# Patient Record
Sex: FEMALE
[De-identification: names, ages, dates, MRNs, and addresses within clinical notes are randomized per-mention and may not be internally consistent; named-entity substitution may affect disease eponyms.]

---

## 2017-04-06 ENCOUNTER — FORM ENCOUNTER (OUTPATIENT)
Age: 27
End: 2017-04-06

## 2017-04-06 ENCOUNTER — OUTPATIENT (OUTPATIENT)
Dept: OUTPATIENT SERVICES | Facility: HOSPITAL | Age: 27
LOS: 1 days | End: 2017-04-06
Payer: MEDICAID

## 2017-04-07 PROCEDURE — 73720 MRI LWR EXTREMITY W/O&W/DYE: CPT

## 2017-04-07 PROCEDURE — A9577: CPT

## 2017-04-07 PROCEDURE — 73720 MRI LWR EXTREMITY W/O&W/DYE: CPT | Mod: 26,LT

## 2022-04-19 ENCOUNTER — HOSPITAL ENCOUNTER (EMERGENCY)
Facility: HOSPITAL | Age: 32
Discharge: HOME | End: 2022-04-19
Attending: EMERGENCY MEDICINE | Admitting: EMERGENCY MEDICINE

## 2022-04-19 VITALS
RESPIRATION RATE: 18 BRPM | HEIGHT: 66 IN | TEMPERATURE: 97.5 F | SYSTOLIC BLOOD PRESSURE: 132 MMHG | HEART RATE: 85 BPM | OXYGEN SATURATION: 99 % | DIASTOLIC BLOOD PRESSURE: 86 MMHG

## 2022-04-19 DIAGNOSIS — N89.8 VAGINAL DISCHARGE: Primary | ICD-10-CM

## 2022-04-19 DIAGNOSIS — B37.3 YEAST VAGINITIS: ICD-10-CM

## 2022-04-19 LAB
BILIRUB UR QL STRIP: NEGATIVE
CLARITY UR: CLEAR
COLOR UR: YELLOW
EXT PREG TEST URINE: NORMAL
EXT. CONTROL ED NAV: NORMAL
GLUCOSE UR STRIP-MCNC: NEGATIVE MG/DL
HGB UR QL STRIP.AUTO: NEGATIVE
KETONES UR STRIP-MCNC: NEGATIVE MG/DL
LEUKOCYTE ESTERASE UR QL STRIP: NEGATIVE
NITRITE UR QL STRIP: NEGATIVE
PH UR STRIP.AUTO: 6 [PH] (ref 4.5–8)
PROT UR STRIP-MCNC: NEGATIVE MG/DL
SP GR UR STRIP.AUTO: >=1.03 (ref 1–1.03)
UROBILINOGEN UR QL STRIP.AUTO: 0.2 E.U./DL

## 2022-04-19 PROCEDURE — 87591 N.GONORRHOEAE DNA AMP PROB: CPT | Performed by: PHYSICIAN ASSISTANT

## 2022-04-19 PROCEDURE — 81003 URINALYSIS AUTO W/O SCOPE: CPT

## 2022-04-19 PROCEDURE — 99284 EMERGENCY DEPT VISIT MOD MDM: CPT | Performed by: PHYSICIAN ASSISTANT

## 2022-04-19 PROCEDURE — 87510 GARDNER VAG DNA DIR PROBE: CPT | Performed by: PHYSICIAN ASSISTANT

## 2022-04-19 PROCEDURE — 87660 TRICHOMONAS VAGIN DIR PROBE: CPT | Performed by: PHYSICIAN ASSISTANT

## 2022-04-19 PROCEDURE — 87491 CHLMYD TRACH DNA AMP PROBE: CPT | Performed by: PHYSICIAN ASSISTANT

## 2022-04-19 PROCEDURE — 81025 URINE PREGNANCY TEST: CPT | Performed by: PHYSICIAN ASSISTANT

## 2022-04-19 PROCEDURE — 99284 EMERGENCY DEPT VISIT MOD MDM: CPT

## 2022-04-19 PROCEDURE — 87480 CANDIDA DNA DIR PROBE: CPT | Performed by: PHYSICIAN ASSISTANT

## 2022-04-19 RX ORDER — METRONIDAZOLE 500 MG/1
500 TABLET ORAL EVERY 12 HOURS SCHEDULED
Qty: 14 TABLET | Refills: 0 | Status: SHIPPED | OUTPATIENT
Start: 2022-04-19 | End: 2022-04-19 | Stop reason: SDUPTHER

## 2022-04-19 RX ORDER — METRONIDAZOLE 500 MG/1
500 TABLET ORAL EVERY 12 HOURS SCHEDULED
Qty: 14 TABLET | Refills: 0 | Status: SHIPPED | OUTPATIENT
Start: 2022-04-19 | End: 2022-04-26

## 2022-04-19 RX ORDER — FLUCONAZOLE 150 MG/1
150 TABLET ORAL ONCE
Status: COMPLETED | OUTPATIENT
Start: 2022-04-19 | End: 2022-04-19

## 2022-04-19 RX ADMIN — FLUCONAZOLE 150 MG: 150 TABLET ORAL at 18:44

## 2022-04-19 NOTE — ED PROVIDER NOTES
History  Chief Complaint   Patient presents with    Abdominal Pain     Pt reports LLQ pain and reports unprotected sex "a few weeks ago" reports discomfort and odor with discharge  Patient is a 35 y/o female with no significant PMH who presents for evaluation of suprapubic discomfort and vaginal discharge  She states she always feels some heaviness to her suprapubic region but has attributed it to her IUD  She states she also started with some foul smelling vaginal discharge for 2-3 days  She states she does gets frequent BV episodes but states it seems slightly different  She states she did have unprotected intercourse a few weeks ago  She states the next day after that she was tested for STDs and was negative however she's concerned it was too early  She's concerned she may have UTI or STD  She denies fever, chills, chest pain, shortness breath, flank pain, dysuria, hematuria, abnormal vaginal bleeding, pain with intercourse, genital rash or lesions  None       History reviewed  No pertinent past medical history  History reviewed  No pertinent surgical history  History reviewed  No pertinent family history  I have reviewed and agree with the history as documented  E-Cigarette/Vaping     E-Cigarette/Vaping Substances     Social History     Tobacco Use    Smoking status: Not on file    Smokeless tobacco: Not on file   Substance Use Topics    Alcohol use: Not on file    Drug use: Not on file       Review of Systems   Constitutional: Negative for chills and fever  Respiratory: Negative for shortness of breath  Cardiovascular: Negative for chest pain  Gastrointestinal: Positive for abdominal pain and nausea  Negative for diarrhea and vomiting  Genitourinary: Positive for dysuria, frequency, pelvic pain and vaginal discharge  Negative for dyspareunia, genital sores and hematuria  Skin: Negative for rash  All other systems reviewed and are negative        Physical Exam  Physical Exam  Vitals and nursing note reviewed  Exam conducted with a chaperone present  Constitutional:       General: She is not in acute distress  Appearance: Normal appearance  She is well-developed and normal weight  She is not ill-appearing, toxic-appearing or diaphoretic  HENT:      Head: Normocephalic and atraumatic  Right Ear: External ear normal       Left Ear: External ear normal       Nose: Nose normal    Eyes:      Conjunctiva/sclera: Conjunctivae normal    Cardiovascular:      Rate and Rhythm: Normal rate and regular rhythm  Heart sounds: Normal heart sounds  No murmur heard  Pulmonary:      Effort: Pulmonary effort is normal  No respiratory distress  Breath sounds: Normal breath sounds  No stridor  No wheezing or rhonchi  Abdominal:      General: Abdomen is flat  Bowel sounds are normal  There is no distension  Palpations: Abdomen is soft  Tenderness: There is abdominal tenderness  There is no guarding  Comments: Mild suprapubic discomfort to palpation  Genitourinary:     Comments: Female ED tech chaperone present  External genitalia normal without rashes or lesions  There is thick white discharge noted to the vaginal vault  Cervix visualized without friability or other bleeding/discharge  Bimanual exam without CMT or palpable adnexal masses  Musculoskeletal:      Cervical back: Normal range of motion  Skin:     General: Skin is warm  Neurological:      General: No focal deficit present  Mental Status: She is alert     Psychiatric:         Mood and Affect: Mood normal          Vital Signs  ED Triage Vitals   Temperature Pulse Respirations Blood Pressure SpO2   04/19/22 1652 04/19/22 1651 04/19/22 1651 04/19/22 1651 04/19/22 1651   97 5 °F (36 4 °C) 85 18 132/86 99 %      Temp Source Heart Rate Source Patient Position - Orthostatic VS BP Location FiO2 (%)   04/19/22 1652 04/19/22 1651 04/19/22 1651 04/19/22 1651 --   Oral Monitor Sitting Right arm Pain Score       --                  Vitals:    04/19/22 1651   BP: 132/86   Pulse: 85   Patient Position - Orthostatic VS: Sitting         Visual Acuity      ED Medications  Medications   fluconazole (DIFLUCAN) tablet 150 mg (150 mg Oral Given 4/19/22 1844)       Diagnostic Studies  Results Reviewed     Procedure Component Value Units Date/Time    VAGINOSIS DNA PROBE (AFFIRM) [[id number]] Collected: 04/19/22 1836    Lab Status: In process Specimen: Genital from Vaginal Updated: 04/19/22 1849    Chlamydia/GC amplified DNA by PCR [[id number]] Collected: 04/19/22 1752    Lab Status: In process Specimen: Urine, Other Updated: 04/19/22 1758    POCT pregnancy, urine [[id number]]  (Normal) Resulted: 04/19/22 1752    Lab Status: Final result Updated: 04/19/22 1752     EXT PREG TEST UR (Ref: Negative) negative (-)     Control valid    Urine Macroscopic, POC [[id number]] Collected: 04/19/22 1748    Lab Status: Final result Specimen: Urine Updated: 04/19/22 1750     Color, UA Yellow     Clarity, UA Clear     pH, UA 6 0     Leukocytes, UA Negative     Nitrite, UA Negative     Protein, UA Negative mg/dl      Glucose, UA Negative mg/dl      Ketones, UA Negative mg/dl      Urobilinogen, UA 0 2 E U /dl      Bilirubin, UA Negative     Blood, UA Negative     Specific Gravity, UA >=1 030    Narrative:      CLINITEK RESULT                 No orders to display              Procedures  Procedures         ED Course                               SBIRT 22yo+      Most Recent Value   SBIRT (22 yo +)    In order to provide better care to our patients, we are screening all of our patients for alcohol and drug use  Would it be okay to ask you these screening questions? No Filed at: 04/19/2022 1652                    MDM  Number of Diagnoses or Management Options  Vaginal discharge  Yeast vaginitis  Diagnosis management comments: Urine pregnancy negative  UA unremarkable  No sign of UTI  Will treat for yeast vaginitis with fluconazole  Patient also requesting Flagyl for possible BV as she gets this frequently  Will send script to the pharmacy  Discussed empiric treatment for STDs including gonorrhea and Chlamydia however patient would like to hold off at this time in till she gets official results  Explained to patient that she should refrain from sexual intercourse until she gets results  If positive she will need to be treated  Instructed to follow-up with her OBGYN  Given contact information for the [facility] to schedule appointment if she does not have an Dr   Patient states understanding and agrees with plan  Amount and/or Complexity of Data Reviewed  Clinical lab tests: ordered and reviewed    Patient Progress  Patient progress: stable      Disposition  Final diagnoses:   Vaginal discharge   Yeast vaginitis     Time reflects when diagnosis was documented in both MDM as applicable and the Disposition within this note     Time User Action Codes Description Comment    4/19/2022  6:39 PM Deyanira Darlington Add [N89 8] Vaginal discharge     4/19/2022  6:40 PM Deyanira Darlington Add [B37 3] Yeast vaginitis       ED Disposition     ED Disposition Condition Date/Time Comment    Discharge Stable Tue Apr 19, 2022  6:39 PM Ronan Westbrook discharge to home/self care              Follow-up Information     Follow up With Specialties Details Why Contact Info Additional [address] [facility] Obstetrics and Gynecology Schedule an appointment as soon as possible for a visit  As needed if you do not have an [id number] [address] [phone] [address], [address] [place] [address], [address] [place] [address], [address] [address] [place] [address] Emergency Department Emergency Medicine  If symptoms worsen [facility] [phone]  [address] [place] [address] Emergency Department, [facility] SPECIALTY [facility] [place], [place], [address]          Discharge Medication List as of 4/19/2022  6:41 PM      START taking these medications    Details   metroNIDAZOLE (FLAGYL) 500 mg tablet Take 1 tablet (500 mg total) by mouth every 12 (twelve) hours for 7 days, Starting Tue 4/19/2022, Until Tue 4/26/2022, Normal             No discharge procedures on file      PDMP Review     None          ED Provider  Electronically Signed by           Mone Christopher PA-C  04/19/22 1956

## 2022-04-20 LAB
C TRACH DNA SPEC QL NAA+PROBE: NEGATIVE
N GONORRHOEA DNA SPEC QL NAA+PROBE: NEGATIVE

## 2022-04-21 LAB
CANDIDA RRNA VAG QL PROBE: NEGATIVE
G VAGINALIS RRNA GENITAL QL PROBE: POSITIVE
T VAGINALIS RRNA GENITAL QL PROBE: NEGATIVE

## 2022-07-06 ENCOUNTER — HOSPITAL ENCOUNTER (EMERGENCY)
Facility: HOSPITAL | Age: 32
Discharge: HOME | End: 2022-07-06
Attending: EMERGENCY MEDICINE

## 2022-07-06 VITALS
TEMPERATURE: 97.5 F | OXYGEN SATURATION: 99 % | HEART RATE: 65 BPM | RESPIRATION RATE: 18 BRPM | DIASTOLIC BLOOD PRESSURE: 81 MMHG | BODY MASS INDEX: 32.35 KG/M2 | WEIGHT: 200.4 LBS | SYSTOLIC BLOOD PRESSURE: 123 MMHG

## 2022-07-06 DIAGNOSIS — N89.8 VAGINAL DISCHARGE: Primary | ICD-10-CM

## 2022-07-06 DIAGNOSIS — A74.9 CHLAMYDIA: ICD-10-CM

## 2022-07-06 DIAGNOSIS — H60.90 OTITIS EXTERNA: ICD-10-CM

## 2022-07-06 LAB
BILIRUB UR QL STRIP: NEGATIVE
C TRACH DNA SPEC QL NAA+PROBE: POSITIVE
CLARITY UR: CLEAR
COLOR UR: YELLOW
EXT PREG TEST URINE: NEGATIVE
EXT. CONTROL ED NAV: NORMAL
FLUAV RNA RESP QL NAA+PROBE: NEGATIVE
FLUBV RNA RESP QL NAA+PROBE: NEGATIVE
GLUCOSE UR STRIP-MCNC: NEGATIVE MG/DL
HGB UR QL STRIP.AUTO: NEGATIVE
KETONES UR STRIP-MCNC: NEGATIVE MG/DL
LEUKOCYTE ESTERASE UR QL STRIP: NEGATIVE
N GONORRHOEA DNA SPEC QL NAA+PROBE: NEGATIVE
NITRITE UR QL STRIP: NEGATIVE
PH UR STRIP.AUTO: 6.5 [PH]
PROT UR STRIP-MCNC: NEGATIVE MG/DL
SARS-COV-2 RNA RESP QL NAA+PROBE: NEGATIVE
SP GR UR STRIP.AUTO: 1.02 (ref 1–1.03)
UROBILINOGEN UR QL STRIP.AUTO: 0.2 E.U./DL

## 2022-07-06 PROCEDURE — 81025 URINE PREGNANCY TEST: CPT

## 2022-07-06 PROCEDURE — 87591 N.GONORRHOEAE DNA AMP PROB: CPT

## 2022-07-06 PROCEDURE — 87491 CHLMYD TRACH DNA AMP PROBE: CPT

## 2022-07-06 PROCEDURE — 81003 URINALYSIS AUTO W/O SCOPE: CPT

## 2022-07-06 PROCEDURE — 87510 GARDNER VAG DNA DIR PROBE: CPT

## 2022-07-06 PROCEDURE — 87636 SARSCOV2 & INF A&B AMP PRB: CPT

## 2022-07-06 PROCEDURE — 87660 TRICHOMONAS VAGIN DIR PROBE: CPT

## 2022-07-06 PROCEDURE — 99283 EMERGENCY DEPT VISIT LOW MDM: CPT

## 2022-07-06 PROCEDURE — 87480 CANDIDA DNA DIR PROBE: CPT

## 2022-07-06 PROCEDURE — 99284 EMERGENCY DEPT VISIT MOD MDM: CPT

## 2022-07-06 RX ORDER — METRONIDAZOLE 500 MG/1
500 TABLET ORAL EVERY 8 HOURS SCHEDULED
Qty: 21 TABLET | Refills: 0 | Status: SHIPPED | OUTPATIENT
Start: 2022-07-06 | End: 2022-07-13

## 2022-07-06 RX ORDER — PREDNISONE 20 MG/1
20 TABLET ORAL DAILY
Qty: 5 TABLET | Refills: 0 | Status: SHIPPED | OUTPATIENT
Start: 2022-07-06 | End: 2022-07-11

## 2022-07-06 RX ORDER — CIPROFLOXACIN AND DEXAMETHASONE 3; 1 MG/ML; MG/ML
4 SUSPENSION/ DROPS AURICULAR (OTIC) 2 TIMES DAILY
Status: DISCONTINUED | OUTPATIENT
Start: 2022-07-06 | End: 2022-07-06

## 2022-07-06 RX ORDER — CIPROFLOXACIN AND DEXAMETHASONE 3; 1 MG/ML; MG/ML
4 SUSPENSION/ DROPS AURICULAR (OTIC) 2 TIMES DAILY
Status: DISCONTINUED | OUTPATIENT
Start: 2022-07-06 | End: 2022-07-06 | Stop reason: HOSPADM

## 2022-07-06 RX ADMIN — CIPROFLOXACIN AND DEXAMETHASONE 4 DROP: 3; 1 SUSPENSION/ DROPS AURICULAR (OTIC) at 03:55

## 2022-07-06 NOTE — DISCHARGE INSTRUCTIONS
We will call if positive:  -Gonorrhea/Chlamydia  -COVID/Flu  -Yeast infection  -Bacterial Vaginosis  -Trichomoniasis    Follow up with your OB GYN    Use Ciprodex drops in both ears : 4 drops, twice daily, for 7 days    Prednisone for ear if COVID is negative and drops don't work    Flagyl if BV positive- do not consume any alcohol within 48 hours of taking this

## 2022-07-07 RX ORDER — DOXYCYCLINE HYCLATE 100 MG/1
100 CAPSULE ORAL 2 TIMES DAILY
Qty: 20 CAPSULE | Refills: 0 | Status: SHIPPED | OUTPATIENT
Start: 2022-07-07 | End: 2022-07-14

## 2022-07-14 NOTE — ED PROVIDER NOTES
History  Chief Complaint   Patient presents with    Earache     Patient reports she started two weeks ago with a left earache and now reports she has a right ear ache as well   Vaginal Discharge     Patient reports yellow vaginal discharge for the past week, reports recently she had unprotected sex but also reports she gets bacterial vaginosis frequently so she is unsure if it is that  The patient is a 25-year-old female with no significant past medical history who presents to the ED with 2 complaints  She reports that, for the past 2 weeks, she has had an earache  She reports that was initially in the left ear, has now also on the right ear  She does report associated clear drainage from bilateral ears  She describes this pain as a pressure  Otherwise denies fever, chills, cough, chest pain, dyspnea  Does report associated congestion  She also reports for the past week, she has had yellow vaginal discharge  She reports that this discharge is similar in odor and appearance was when she has had bacterial vaginosis in the past   She otherwise denies abdominal pain, nausea, vomiting, vaginal bleeding, dysuria, hematuria  None       Past Medical History:   Diagnosis Date    Asthma        History reviewed  No pertinent surgical history  History reviewed  No pertinent family history  I have reviewed and agree with the history as documented  E-Cigarette/Vaping     E-Cigarette/Vaping Substances     Social History     Tobacco Use    Smoking status: Current Every Day Smoker     Packs/day: 1 00     Types: Cigarettes    Smokeless tobacco: Never Used   Substance Use Topics    Alcohol use: Yes    Drug use: Yes     Types: Marijuana       Review of Systems   Constitutional: Negative for chills and fever  HENT: Positive for congestion, ear discharge and ear pain  Negative for rhinorrhea  Respiratory: Negative for cough and shortness of breath      Cardiovascular: Negative for chest pain and leg swelling  Gastrointestinal: Negative for abdominal pain, constipation, diarrhea, nausea and vomiting  Genitourinary: Positive for vaginal discharge  Negative for dysuria, flank pain, hematuria and vaginal bleeding  Musculoskeletal: Negative for arthralgias and myalgias  Skin: Negative for rash and wound  Neurological: Negative for dizziness, weakness, numbness and headaches  Psychiatric/Behavioral: Negative for behavioral problems  Physical Exam  Physical Exam  Vitals and nursing note reviewed  Constitutional:       General: She is not in acute distress  Appearance: She is well-developed  HENT:      Head: Normocephalic and atraumatic  Right Ear: Tympanic membrane normal  No decreased hearing noted  Swelling and tenderness (when pinna is pulled) present  No drainage  No mastoid tenderness  Left Ear: Tympanic membrane normal  No decreased hearing noted  Swelling and tenderness ( when pinna is pulled) present  No drainage  No mastoid tenderness  Nose: Congestion present  Mouth/Throat:      Mouth: Mucous membranes are moist    Eyes:      Conjunctiva/sclera: Conjunctivae normal    Cardiovascular:      Rate and Rhythm: Normal rate and regular rhythm  Heart sounds: No murmur heard  Pulmonary:      Effort: Pulmonary effort is normal  No respiratory distress  Breath sounds: Normal breath sounds  Abdominal:      Palpations: Abdomen is soft  Tenderness: There is no abdominal tenderness  There is no right CVA tenderness, left CVA tenderness, guarding or rebound  Musculoskeletal:         General: Normal range of motion  Cervical back: Neck supple  Skin:     General: Skin is warm and dry  Neurological:      Mental Status: She is alert           Vital Signs  ED Triage Vitals   Temperature Pulse Respirations Blood Pressure SpO2   07/06/22 0050 07/06/22 0048 07/06/22 0048 07/06/22 0048 07/06/22 0048   97 5 °F (36 4 °C) 73 20 122/84 100 %      Temp Source Heart Rate Source Patient Position - Orthostatic VS BP Location FiO2 (%)   07/06/22 0050 07/06/22 0048 07/06/22 0048 07/06/22 0048 --   Oral Monitor Sitting Right arm       Pain Score       07/06/22 0048       7           Vitals:    07/06/22 0048 07/06/22 0315   BP: 122/84 123/81   Pulse: 73 65   Patient Position - Orthostatic VS: Sitting Sitting         Visual Acuity      ED Medications  Medications - No data to display    Diagnostic Studies  Results Reviewed     Procedure Component Value Units Date/Time    VAGINOSIS DNA PROBE (AFFIRM) [[id number]]  (Abnormal) Collected: 07/06/22 0320    Lab Status: Final result Specimen: Genital from Vaginal Updated: 07/07/22 1605     Candida Species Negative     Gardnerella vaginalis Positive     Trichomonas vaginalis Negative    Narrative:      Performed at:  [address] [place] [address]  [id number]  : Dillon Sierra MD, Phone:  [phone]    [facility] amplified DNA by PCR [[id number]]  (Abnormal) Collected: 07/06/22 0308    Lab Status: Final result Specimen: Urine, Other Updated: 07/06/22 2118     N gonorrhoeae, DNA Probe Negative     Chlamydia trachomatis, DNA Probe Positive    Narrative:      Test performed using PCR amplification of target DNA  This test is intended as an aid in the diagnosis of Chlamydial and gonococcal disease  This test has not been evaluated in patients younger than 15years of age and is not recommended for evaluation of suspected sexual abuse  Additional testing is recommended when the results do not correlate with clinical signs and symptoms        FLU/COVID - if FLU clinically relevant [[id number]]  (Normal) Collected: 07/06/22 0308    Lab Status: Final result Specimen: Nares from Nose Updated: 07/06/22 0446     SARS-CoV-2 Negative     INFLUENZA A PCR Negative     INFLUENZA B PCR Negative    Narrative:      FOR PEDIATRIC PATIENTS - copy/paste COVID Guidelines URL to browser: [email or web address]    SARS-CoV-2 assay is a Nucleic Acid Amplification assay intended for the  qualitative detection of nucleic acid from SARS-CoV-2 in nasopharyngeal  swabs  Results are for the presumptive identification of SARS-CoV-2 RNA  Positive results are indicative of infection with SARS-CoV-2, the virus  causing COVID-19, but do not rule out bacterial infection or co-infection  with other viruses  Laboratories within the [place] and its  territories are required to report all positive results to the appropriate  public health authorities  Negative results do not preclude SARS-CoV-2  infection and should not be used as the sole basis for treatment or other  patient management decisions  Negative results must be combined with  clinical observations, patient history, and epidemiological information  This test has not been FDA cleared or approved  This test has been authorized by FDA under an Emergency Use Authorization  (EUA)  This test is only authorized for the duration of time the  declaration that circumstances exist justifying the authorization of the  emergency use of an in vitro diagnostic tests for detection of SARS-CoV-2  virus and/or diagnosis of COVID-19 infection under section 564(b)(1) of  the Act, 21 U  S C  448HEP-2(Z)(7), unless the authorization is terminated  or revoked sooner  The test has been validated but independent review by FDA  and CLIA is pending        UA w Reflex to Microscopic w Reflex to Culture [[id number]] Collected: 07/06/22 0308    Lab Status: Final result Specimen: Urine, Clean Catch Updated: 07/06/22 0334     Color, UA Yellow     Clarity, UA Clear     Specific Gravity, UA 1 025     pH, UA 6 5     Leukocytes, UA Negative     Nitrite, UA Negative     Protein, UA Negative mg/dl      Glucose, UA Negative mg/dl      Ketones, UA Negative mg/dl      Urobilinogen, UA 0 2 E U /dl      Bilirubin, UA Negative     Occult Blood, UA Negative    POCT pregnancy, urine [[id number]]  (Normal) Resulted: 07/06/22 0313    Lab Status: Final result Updated: 07/06/22 0313     EXT PREG TEST UR (Ref: Negative) Negative     Control Valid                 No orders to display              Procedures  Procedures         ED Course           SBIRT 22yo+    Flowsheet Row Most Recent Value   SBIRT (25 yo +)    In order to provide better care to our patients, we are screening all of our patients for alcohol and drug use  Would it be okay to ask you these screening questions? No Filed at: 07/06/2022 0224                    MDM  Number of Diagnoses or Management Options  Otitis externa: new and requires workup  Vaginal discharge: new and requires workup     Amount and/or Complexity of Data Reviewed  Clinical lab tests: ordered and reviewed  Tests in the medicine section of CPT®: ordered and reviewed  Decide to obtain previous medical records or to obtain history from someone other than the patient: yes  Review and summarize past medical records: yes    Risk of Complications, Morbidity, and/or Mortality  Presenting problems: moderate  Management options: low    Patient Progress  Patient progress: improved    Patient is a 34 YOF presenting for bilateral ear pain and drainage with associated congestion x 2 weeks, as well as yellow vaginal discharge x 1 week  No associated fever, chills, abdominal pain, nausea/vomiting, vaginal discharge, cough, cp, sob  Yarelis, patient with evidence of otitis externa on exam; tenderness with tugging on pinna, mild edema to canal bl  TM without erythema or perforation  Abdomen soft and nontender  No CVA tenderness  Will obtain UA, GC/C, vaginosis panel, U preg  Will treat patient's OE  Will write rx for Flagyl given patient's hx of similar symptoms in case positive  Patient advised to avoid alcohol x 48 hours while taking if positive  At the time of discharge, the patient is in no acute distress   I discussed with the patient the diagnosis, treatment plan, follow-up, return precautions, and discharge instructions; they were given the opportunity to ask questions and verbalized understanding  Disposition  Final diagnoses:   Vaginal discharge   Otitis externa   Chlamydia     Time reflects when diagnosis was documented in both MDM as applicable and the Disposition within this note     Time User Action Codes Description Comment    7/6/2022  3:47 AM Layla Spikes Add [N89 8] Vaginal discharge     7/6/2022  3:47 AM Layla Spikes Add [H60 90] Otitis externa     7/7/2022  1:42 PM Rosales Peace Add [A74 9] Chlamydia       ED Disposition     ED Disposition   Discharge    Condition   Stable    Date/Time   Wed Jul 6, 2022 Adriano Javier discharge to home/self care  Follow-up Information    None         Discharge Medication List as of 7/6/2022  3:52 AM      START taking these medications    Details   metroNIDAZOLE (FLAGYL) 500 mg tablet Take 1 tablet (500 mg total) by mouth every 8 (eight) hours for 7 days, Starting Wed 7/6/2022, Until Wed 7/13/2022, Normal      predniSONE 20 mg tablet Take 1 tablet (20 mg total) by mouth daily for 5 days, Starting Wed 7/6/2022, Until Mon 7/11/2022, Normal             No discharge procedures on file      PDMP Review     None          ED Provider  Electronically Signed by           Shimon Lo PA-C  07/14/22 6236

## 2022-12-23 ENCOUNTER — HOSPITAL ENCOUNTER (EMERGENCY)
Facility: HOSPITAL | Age: 32
Discharge: HOME | End: 2022-12-23
Attending: EMERGENCY MEDICINE

## 2022-12-23 VITALS
RESPIRATION RATE: 17 BRPM | SYSTOLIC BLOOD PRESSURE: 118 MMHG | HEART RATE: 67 BPM | OXYGEN SATURATION: 96 % | DIASTOLIC BLOOD PRESSURE: 79 MMHG | BODY MASS INDEX: 33.34 KG/M2 | TEMPERATURE: 97.5 F | WEIGHT: 206.57 LBS

## 2022-12-23 DIAGNOSIS — R21 RASH: Primary | ICD-10-CM

## 2022-12-23 RX ORDER — DIPHENHYDRAMINE HCL 25 MG
50 TABLET ORAL
Qty: 14 TABLET | Refills: 0 | Status: SHIPPED | OUTPATIENT
Start: 2022-12-23

## 2022-12-23 RX ORDER — CEPHALEXIN 500 MG/1
500 CAPSULE ORAL 3 TIMES DAILY
Qty: 21 CAPSULE | Refills: 0 | Status: SHIPPED | OUTPATIENT
Start: 2022-12-23 | End: 2022-12-30

## 2022-12-23 RX ORDER — DIPHENHYDRAMINE HCL 25 MG
50 TABLET ORAL ONCE
Status: COMPLETED | OUTPATIENT
Start: 2022-12-23 | End: 2022-12-23

## 2022-12-23 RX ORDER — CEPHALEXIN 250 MG/1
500 CAPSULE ORAL ONCE
Status: COMPLETED | OUTPATIENT
Start: 2022-12-23 | End: 2022-12-23

## 2022-12-23 RX ORDER — HYDROCORTISONE 25 MG/ML
LOTION TOPICAL 2 TIMES DAILY
Qty: 50 ML | Refills: 0 | Status: SHIPPED | OUTPATIENT
Start: 2022-12-23

## 2022-12-23 RX ORDER — CETIRIZINE HYDROCHLORIDE 10 MG/1
10 TABLET ORAL DAILY
Qty: 7 TABLET | Refills: 0 | Status: SHIPPED | OUTPATIENT
Start: 2022-12-23

## 2022-12-23 RX ADMIN — CEPHALEXIN 500 MG: 250 CAPSULE ORAL at 04:13

## 2022-12-23 RX ADMIN — DIPHENHYDRAMINE HCL 50 MG: 25 TABLET, COATED ORAL at 04:13

## 2022-12-23 RX ADMIN — DEXAMETHASONE SODIUM PHOSPHATE 10 MG: 10 INJECTION, SOLUTION INTRAMUSCULAR; INTRAVENOUS at 04:13

## 2022-12-23 NOTE — Clinical Note
Jayson Hernandez was seen and treated in our emergency department on 12/23/2022  Diagnosis:     Louise   may return to work on return date  She may return on this date: 12/26/2022         If you have any questions or concerns, please don't hesitate to call        Latia Martin MD    ______________________________           _______________          _______________  Hospital Representative                              Date                                Time

## 2022-12-23 NOTE — ED PROVIDER NOTES
History  Chief Complaint   Patient presents with   • Insect Bite     Pt states she got bit by a spider ~4 days ago, and "popped" it yesterday  Pt c/o rash starting ~3 days ago, +redness,itching     HPI    None       Past Medical History:   Diagnosis Date   • Asthma        No past surgical history on file  No family history on file  I have reviewed and agree with the history as documented  E-Cigarette/Vaping     E-Cigarette/Vaping Substances     Social History     Tobacco Use   • Smoking status: Every Day     Packs/day: 1 00     Types: Cigarettes   • Smokeless tobacco: Never   Substance Use Topics   • Alcohol use: Yes   • Drug use: Yes     Types: Marijuana       Review of Systems    Physical Exam  Physical Exam    Vital Signs  ED Triage Vitals   Temperature Pulse Respirations Blood Pressure SpO2   12/23/22 0215 12/23/22 0215 12/23/22 0215 12/23/22 0219 12/23/22 0215   97 5 °F (36 4 °C) 67 17 118/79 96 %      Temp Source Heart Rate Source Patient Position - Orthostatic VS BP Location FiO2 (%)   12/23/22 0215 12/23/22 0215 12/23/22 0215 12/23/22 0215 --   Oral Monitor Sitting Right arm       Pain Score       --                  Vitals:    12/23/22 0215 12/23/22 0219   BP:  118/79   Pulse: 67    Patient Position - Orthostatic VS: Sitting          Visual Acuity      ED Medications  Medications   dexamethasone oral liquid 10 mg 1 mL (has no administration in time range)   diphenhydrAMINE (BENADRYL) tablet 50 mg (has no administration in time range)   cephalexin (KEFLEX) capsule 500 mg (has no administration in time range)       Diagnostic Studies  Results Reviewed     None                 No orders to display              Procedures  Procedures         ED Course                                             MDM    Disposition  Final diagnoses:   None     ED Disposition     None      Follow-up Information    None         Patient's Medications    No medications on file       No discharge procedures on file      PDMP Review     None          ED Provider  Electronically Signed by 12/23/22 0413)   diphenhydrAMINE (BENADRYL) tablet 50 mg (50 mg Oral Given 12/23/22 0413)   cephalexin (KEFLEX) capsule 500 mg (500 mg Oral Given 12/23/22 0413)       Diagnostic Studies  Results Reviewed     None                 No orders to display              Procedures  Procedures         ED Course                                             MDM  Number of Diagnoses or Management Options      Disposition  Final diagnoses:   Rash     Time reflects when diagnosis was documented in both MDM as applicable and the Disposition within this note     Time User Action Codes Description Comment    12/23/2022  4:13 AM Erich Rebolledo Add [R21] Rash       ED Disposition     ED Disposition   Discharge    Condition   Stable    Date/Time   Fri Dec 23, 2022  4:13 AM    Comment   Ronan Westbrook discharge to home/self care  Follow-up Information    None         Discharge Medication List as of 12/23/2022  4:15 AM      START taking these medications    Details   cephalexin (KEFLEX) 500 mg capsule Take 1 capsule (500 mg total) by mouth 3 (three) times a day for 7 days, Starting Fri 12/23/2022, Until Fri 12/30/2022, Normal      cetirizine (ZyrTEC) 10 mg tablet Take 1 tablet (10 mg total) by mouth daily, Starting Fri 12/23/2022, Normal      diphenhydrAMINE (BENADRYL) 25 mg tablet Take 2 tablets (50 mg total) by mouth daily at bedtime, Starting Fri 12/23/2022, Normal      hydrocortisone 2 5 % lotion Apply topically 2 (two) times a day, Starting Fri 12/23/2022, Normal             No discharge procedures on file      PDMP Review     None          ED Provider  Electronically Signed by           Sal Oscar MD  01/05/23 1782

## 2023-01-04 ENCOUNTER — HOSPITAL ENCOUNTER (EMERGENCY)
Facility: HOSPITAL | Age: 33
Discharge: HOME | End: 2023-01-04
Attending: EMERGENCY MEDICINE

## 2023-01-04 VITALS
WEIGHT: 200.62 LBS | HEART RATE: 71 BPM | DIASTOLIC BLOOD PRESSURE: 79 MMHG | BODY MASS INDEX: 32.38 KG/M2 | RESPIRATION RATE: 18 BRPM | TEMPERATURE: 97.8 F | OXYGEN SATURATION: 99 % | SYSTOLIC BLOOD PRESSURE: 132 MMHG

## 2023-01-04 DIAGNOSIS — N90.89 LABIAL LESION: Primary | ICD-10-CM

## 2023-01-04 LAB
BACTERIA UR QL AUTO: ABNORMAL /HPF
BILIRUB UR QL STRIP: NEGATIVE
CLARITY UR: ABNORMAL
COLOR UR: YELLOW
EXT PREGNANCY TEST URINE: NEGATIVE
EXT. CONTROL: NORMAL
GLUCOSE UR STRIP-MCNC: NEGATIVE MG/DL
HCV AB SER QL: NORMAL
HGB UR QL STRIP.AUTO: ABNORMAL
HIV 1+2 AB+HIV1 P24 AG SERPL QL IA: NORMAL
HIV1 P24 AG SER QL: NORMAL
KETONES UR STRIP-MCNC: NEGATIVE MG/DL
LEUKOCYTE ESTERASE UR QL STRIP: ABNORMAL
NITRITE UR QL STRIP: NEGATIVE
NON-SQ EPI CELLS URNS QL MICRO: ABNORMAL /HPF
PH UR STRIP.AUTO: 6.5 [PH] (ref 4.5–8)
PROT UR STRIP-MCNC: NEGATIVE MG/DL
RBC #/AREA URNS AUTO: ABNORMAL /HPF
RPR SER QL: NORMAL
SP GR UR STRIP.AUTO: 1.02 (ref 1–1.03)
UROBILINOGEN UR QL STRIP.AUTO: 0.2 E.U./DL
WBC #/AREA URNS AUTO: ABNORMAL /HPF

## 2023-01-04 RX ORDER — ERYTHROMYCIN 250 MG/1
500 TABLET, COATED ORAL 4 TIMES DAILY
Qty: 56 TABLET | Refills: 0 | Status: SHIPPED | OUTPATIENT
Start: 2023-01-04 | End: 2023-01-11

## 2023-01-04 RX ORDER — AZITHROMYCIN 250 MG/1
1000 TABLET, FILM COATED ORAL ONCE
Status: COMPLETED | OUTPATIENT
Start: 2023-01-04 | End: 2023-01-04

## 2023-01-04 RX ADMIN — PENICILLIN G BENZATHINE 2.4 MILLION UNITS: 1200000 INJECTION, SUSPENSION INTRAMUSCULAR at 07:58

## 2023-01-04 RX ADMIN — AZITHROMYCIN MONOHYDRATE 1000 MG: 250 TABLET ORAL at 07:57

## 2023-01-04 RX ADMIN — BENZOCAINE AND LEVOMENTHOL 1 APPLICATION: 200; 5 SPRAY TOPICAL at 07:53

## 2023-01-04 NOTE — ED PROVIDER NOTES
History  Chief Complaint   Patient presents with   • Vaginal Pain     Pt reports lower abd discomfort and vaginal pain  Patient also reports open lesion on vulva, reports she had sex a few weeks ago and the condom broke  Pt denies fevers  Patient also reports thick white vaginal discharge  This is a 79-year-old female with past medical history of syphilis, psoriasis and asthma who presents today with lower abdominal discomfort and vaginal pain  Patient reports about 1 week ago she was given antibiotics for a bug bite and noted some vaginal irritation around the time she started taking antibiotics  Thought it was just a yeast infection however the pain continued  Notes about 4 days ago she noticed a painful lesion on her left labia  Reports that she is having some thick white discharge and when she wiped earlier today it was light pink  Denies any foul smell, vaginal itching  Denies any fever or chills  Notes around the time of starting the antibiotic she had sexual intercourse and reports the condom broke  Does have an IUD  Prior to Admission Medications   Prescriptions Last Dose Informant Patient Reported? Taking? cetirizine (ZyrTEC) 10 mg tablet   No No   Sig: Take 1 tablet (10 mg total) by mouth daily   diphenhydrAMINE (BENADRYL) 25 mg tablet   No No   Sig: Take 2 tablets (50 mg total) by mouth daily at bedtime   hydrocortisone 2 5 % lotion   No No   Sig: Apply topically 2 (two) times a day      Facility-Administered Medications: None       Past Medical History:   Diagnosis Date   • Asthma        History reviewed  No pertinent surgical history  History reviewed  No pertinent family history  I have reviewed and agree with the history as documented  E-Cigarette/Vaping     E-Cigarette/Vaping Substances     Social History     Tobacco Use   • Smoking status: Every Day     Packs/day: 1 00     Types: Cigarettes   • Smokeless tobacco: Never   Substance Use Topics   • Alcohol use:  Yes   • Drug use: Yes     Types: Marijuana       Review of Systems   Constitutional: Negative for chills and fever  Gastrointestinal: Positive for abdominal pain  Negative for diarrhea, nausea and vomiting  Genitourinary: Positive for genital sores, vaginal discharge and vaginal pain  All other systems reviewed and are negative  Physical Exam  Physical Exam  Vitals and nursing note reviewed  Exam conducted with a chaperone present  Constitutional:       General: She is in acute distress  Appearance: Normal appearance  She is well-developed  She is not ill-appearing  HENT:      Head: Normocephalic and atraumatic  Eyes:      Conjunctiva/sclera: Conjunctivae normal    Cardiovascular:      Rate and Rhythm: Normal rate and regular rhythm  Heart sounds: No murmur heard  Pulmonary:      Effort: Pulmonary effort is normal  No respiratory distress  Abdominal:      Palpations: Abdomen is soft  Tenderness: There is abdominal tenderness (lower)  Genitourinary:     Exam position: Supine  Labia:         Right: No lesion  Left: Tenderness and lesion present  Musculoskeletal:         General: No swelling  Normal range of motion  Cervical back: Normal range of motion and neck supple  Skin:     General: Skin is warm and dry  Capillary Refill: Capillary refill takes less than 2 seconds  Neurological:      Mental Status: She is alert     Psychiatric:         Mood and Affect: Mood normal              Vital Signs  ED Triage Vitals   Temperature Pulse Respirations Blood Pressure SpO2   01/04/23 0652 01/04/23 0226 01/04/23 0226 01/04/23 0226 01/04/23 0226   97 8 °F (36 6 °C) 76 18 134/86 96 %      Temp Source Heart Rate Source Patient Position - Orthostatic VS BP Location FiO2 (%)   01/04/23 0652 01/04/23 0226 01/04/23 0226 01/04/23 0226 --   Oral Monitor Sitting Right arm       Pain Score       01/04/23 0226       8           Vitals:    01/04/23 0226 01/04/23 0652   BP: 134/86 142/88   Pulse: 76 76   Patient Position - Orthostatic VS: Sitting Sitting         Visual Acuity      ED Medications  Medications   benzocaine-menthol-lanolin-aloe (DERMOPLAST) 20-0 5 % topical spray (1 application Topical Given 1/4/23 0753)   Penicillin G Benzathine (BICILLIN L-A) intramuscular injection 2 4 Million Units (2 4 Million Units Intramuscular Given 1/4/23 0758)   azithromycin (ZITHROMAX) tablet 1,000 mg (1,000 mg Oral Given 1/4/23 0757)       Diagnostic Studies  Results Reviewed     Procedure Component Value Units Date/Time    HSV TYPE 1,2 DNA PCR [facility] ONLY [[id number]] Collected: 01/04/23 0806    Lab Status: No result Specimen: Swab from Vulva     Urine Microscopic [[id number]] Collected: 01/04/23 0644    Lab Status: In process Specimen: Urine, Clean Catch Updated: 01/04/23 0718    Hepatitis C antibody [[id number]] Collected: 01/04/23 0649    Lab Status: In process Specimen: Blood from Arm, Right Updated: 01/04/23 0716    RAPID HIV 1/2 AB-AG COMBO for 15years old and above [[id number]] Collected: 01/04/23 0649    Lab Status: In process Specimen: Blood from Arm, Right Updated: 01/04/23 0716    RPR [[id number]] Collected: 01/04/23 0649    Lab Status: In process Specimen: Blood from Arm, Right Updated: 01/04/23 0716    Chlamydia/GC amplified DNA by PCR [[id number]] Collected: 01/04/23 0649    Lab Status:  In process Specimen: Urine, Other Updated: 01/04/23 0654    Urine Macroscopic, POC [[id number]]  (Abnormal) Collected: 01/04/23 0644    Lab Status: Final result Specimen: Urine Updated: 01/04/23 0646     Color, UA Yellow     Clarity, UA Slightly Cloudy     pH, UA 6 5     Leukocytes, UA Moderate     Nitrite, UA Negative     Protein, UA Negative mg/dl      Glucose, UA Negative mg/dl      Ketones, UA Negative mg/dl      Urobilinogen, UA 0 2 E U /dl      Bilirubin, UA Negative     Occult Blood, UA Trace     Specific [place], UA 1 025    Narrative:      CLINITEK RESULT    POCT pregnancy, urine [[id number]] (Normal) Resulted: 01/04/23 0642    Lab Status: Final result Updated: 01/04/23 0642     EXT Preg Test, Ur Negative     Control Valid                 No orders to display              Procedures  Procedures         ED Course  ED Course as of 01/04/23 0808   Wed Jan 04, 2023   0636 TT to GYN: coming to see pt    0725 TT with Dr Sahra Faustin: saw pt  Treat as late latent  Will arrange follow up  Send home with erythromycin 500 mg QID for 7 days  In the ED will receive PCN and azith                                SBIRT 20yo+    Flowsheet Row Most Recent Value   SBIRT (25 yo +)    In order to provide better care to our patients, we are screening all of our patients for alcohol and drug use  Would it be okay to ask you these screening questions? 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  Pt given pcn and azithromycin here  Will send home with erythromycin  Should follow up with OBGYN  I have discussed the plan to discharge pt from ED  The patient was discharged in stable condition   Patient ambulated off the department   Extensive return to emergency department precautions were discussed   Follow up with appropriate providers including primary care physician was discussed   Patient and/or their  primary decision maker expressed understanding  Angel Murphy remained stable during entire emergency department stay  Portions of the record may have been created with voice recognition software   Occasional wrong word or "sound a like" substitutions may have occurred due to the inherent limitations of voice recognition software  Read the chart carefully and recognize, using context, where substitutions have occurred  Labial lesion: acute illness or injury     Details: chancre vs chancroid   Amount and/or Complexity of Data Reviewed  Labs: ordered  Decision-making details documented in ED Course  Discussion of management or test interpretation with external provider(s): Dr Elizabet Leigh (OBGYN)    Risk  OTC drugs  Prescription drug management  Disposition  Final diagnoses:   Labial lesion     Time reflects when diagnosis was documented in both MDM as applicable and the Disposition within this note     Time User Action Codes Description Comment    1/4/2023  6:45 AM Marielos Mike Add [N90 89] Vulvar lesion     1/4/2023  6:48 AM Marielos Mike Add [N90 89] Labial lesion     1/4/2023  6:48 AM Marielos Mike Modify [N90 89] Vulvar lesion     1/4/2023  6:48 AM Marielos Mike Modify [N90 89] Labial lesion     1/4/2023  6:48 AM Marielos Mike Modify [N90 89] Labial lesion     1/4/2023  6:48 AM Marielos Mike Remove [N90 89] Vulvar lesion       ED Disposition     ED Disposition   Discharge    Condition   Stable    Date/Time   Wed Jan 4, 2023  8:07 AM    Comment   Ronan Westbrook discharge to home/self care                 Follow-up Information     Follow up With Specialties Details Why Contact Info Additional [address] [facility]   [address] [facility]  [place] [address] [id number] [phone] [facility] [address], [address] [facility] [address], [place] [facility], [address]   [address] [place] [address] Obstetrics and Gynecology   [address]  [place] [address] [address]  [address], [address], [address] [facility], [address] [phone]          Patient's Medications   Discharge Prescriptions BENZOCAINE-MENTHOL-LANOLIN-ALOE (DERMOPLAST) 20-0 5 % TOPICAL SPRAY    Apply 1 application topically 4 (four) times a day as needed for mild pain       Start Date: 1/4/2023  End Date: --       Order Dose: 1 application       Quantity: 56 g    Refills: 0    ERYTHROMYCIN BASE 250 MG TABLET    Take 2 tablets (500 mg total) by mouth 4 (four) times a day for 7 days       Start Date: 1/4/2023  End Date: 1/11/2023       Order Dose: 500 mg       Quantity: 56 tablet    Refills: 0       No discharge procedures on file      PDMP Review     None          ED Provider  Electronically Signed by           Alex White PA-C  01/04/23 6477

## 2023-01-04 NOTE — PROGRESS NOTES
Consultation - OB/GYN   Ronan Rosalesd 34 y o  female MRN: [id number]  Unit/Bed#: ED 24 Encounter: [id number]      Chief complaint:  Pain and ulcer on vulva    HPI:  Ms Tana Abdi is a 34 y o  P0 with a painful vulvar ulcer  Patient reports she has been dealing with vulvar pain for a few weeks, owing to what she thinks was a cut when shaving  Was tender for a while, but didn't see an ulcer until 4d prior  Tenderness markedly increased with presentation and is unchanged since  Pain is worse with touch/pressure and with void  She thinks theres a little pink/milky discharge coming from it, but has not tried to express  She endorses pelvic and low back pain, aching in quality  Denies fever/chills, abdominal pain, GI changes  Reports was recently treated for an infection and inquires if yeast may have caused this  Also hx notable for syphilis prior for which she completed treatment  Chart does not support recent syphilis treatment, but does note CT in July  Review of Systems   Constitutional: Negative for chills, fatigue and fever  Eyes: Negative for visual disturbance  Respiratory: Negative for cough and shortness of breath  Cardiovascular: Negative for palpitations  Gastrointestinal: Negative for abdominal distention, abdominal pain and nausea  Endocrine: Negative for cold intolerance and heat intolerance  Genitourinary: Positive for genital sores, menstrual problem (amenorrhea on IUD), pelvic pain and vaginal pain  Negative for frequency, vaginal bleeding and vaginal discharge  Musculoskeletal: Positive for back pain  Skin: Positive for wound  Negative for rash  Neurological: Negative for dizziness and light-headedness  Active Problems: There is no problem list on file for this patient  PMH:  Past Medical History:   Diagnosis Date   • Asthma        PSH:  History reviewed  No pertinent surgical history      Social Hx:  Social History     Tobacco Use   • Smoking status: Every Day Packs/day: 1 00     Types: Cigarettes   • Smokeless tobacco: Never   Substance Use Topics   • Alcohol use: Yes   • Drug use: Yes     Types: Marijuana         Family history:  Non-contributory    OB Hx:  G0    Meds:  No current facility-administered medications on file prior to encounter  Current Outpatient Medications on File Prior to Encounter   Medication Sig Dispense Refill   • cetirizine (ZyrTEC) 10 mg tablet Take 1 tablet (10 mg total) by mouth daily 7 tablet 0   • diphenhydrAMINE (BENADRYL) 25 mg tablet Take 2 tablets (50 mg total) by mouth daily at bedtime 14 tablet 0   • hydrocortisone 2 5 % lotion Apply topically 2 (two) times a day 50 mL 0       Allergies:  No Known Allergies      Physical Exam:  /88 (BP Location: Right arm)   Pulse 76   Temp 97 8 °F (36 6 °C) (Oral)   Resp 18   Wt 91 kg (200 lb 9 9 oz)   SpO2 99%   BMI 32 38 kg/m²     Physical Exam  Exam conducted with a chaperone present  Constitutional:       General: She is not in acute distress  Appearance: Normal appearance  She is not ill-appearing, toxic-appearing or diaphoretic  Eyes:      General: No scleral icterus  Right eye: No discharge  Left eye: No discharge  Conjunctiva/sclera: Conjunctivae normal    Cardiovascular:      Rate and Rhythm: Normal rate  Pulmonary:      Effort: Pulmonary effort is normal  No respiratory distress  Abdominal:      General: There is no distension  Palpations: There is no mass  Tenderness: There is no abdominal tenderness  There is no guarding or rebound  Hernia: No hernia is present  Genitourinary:     Exam position: Lithotomy position  Labia:         Right: No rash, tenderness or lesion  Left: Tenderness and lesion (2x tender, ulcerative lesions of labia majora  the larger is 1cm in diameter, shallow, and well demarcated  2nd is more ovoid  , 6mm in largest diameter, also shallow) present  No rash         Urethra: No prolapse, urethral swelling or urethral lesion  Vagina: No vaginal discharge, erythema or bleeding  Musculoskeletal:         General: No swelling  Skin:     General: Skin is warm and dry  Coloration: Skin is not jaundiced or pale  Findings: No bruising or erythema  Neurological:      Mental Status: She is alert  Psychiatric:         Mood and Affect: Mood normal          Behavior: Behavior normal          Thought Content: Thought content normal          Judgment: Judgment normal              Assessment:   34 y o  P0 with a painful vulvar ulcer  No evidence of abscess  Reported hx of syphilis is concerning for same, however unable to substantiate recent treatment  Discussed with pt haemophilus ducreyi, including difficulty culturing/definitively diagnosing  Given concern, recommend coverage for both and testing for HSV  Plan:   1  Vulvar ulcer  - Recommend treatment for both syphilis and h ducreyi   - PCN 2 4M IM (once here, weekly x3wks if +RPR)   - Azithromycin 1g PO here   - Erythromycin 500mg QID x7d  - HSV pcr collected  - Dermaplast and ibuprofen for pain control    2  History syphilis  - Unclear hx  - RPR ordered  - If positive, would treat as late latent with 3 dose PCN regimen secondary to inability to confirm prior tx course/infective duration

## 2023-01-05 LAB
C TRACH DNA SPEC QL NAA+PROBE: NEGATIVE
HSV1 DNA SPEC QL NAA+PROBE: NOT DETECTED
HSV1 DNA SPEC QL NAA+PROBE: NOT DETECTED
N GONORRHOEA DNA SPEC QL NAA+PROBE: NEGATIVE

## 2024-06-26 ENCOUNTER — HOSPITAL ENCOUNTER (EMERGENCY)
Facility: HOSPITAL | Age: 34
Discharge: HOME | End: 2024-06-26
Attending: EMERGENCY MEDICINE

## 2024-06-26 ENCOUNTER — APPOINTMENT (EMERGENCY)
Dept: RADIOLOGY | Facility: HOSPITAL | Age: 34
End: 2024-06-26

## 2024-06-26 VITALS
OXYGEN SATURATION: 97 % | SYSTOLIC BLOOD PRESSURE: 112 MMHG | DIASTOLIC BLOOD PRESSURE: 93 MMHG | HEART RATE: 86 BPM | TEMPERATURE: 97.6 F | RESPIRATION RATE: 18 BRPM

## 2024-06-26 DIAGNOSIS — R05.9 COUGH IN ADULT: Primary | ICD-10-CM

## 2024-06-26 DIAGNOSIS — J15.9 COMMUNITY ACQUIRED BACTERIAL PNEUMONIA: ICD-10-CM

## 2024-06-26 LAB
EXT PREGNANCY TEST URINE: NEGATIVE
EXT. CONTROL: NORMAL

## 2024-06-26 PROCEDURE — 71046 X-RAY EXAM CHEST 2 VIEWS: CPT

## 2024-06-26 PROCEDURE — 81025 URINE PREGNANCY TEST: CPT

## 2024-06-26 PROCEDURE — 99284 EMERGENCY DEPT VISIT MOD MDM: CPT | Performed by: EMERGENCY MEDICINE

## 2024-06-26 RX ORDER — PREDNISONE 20 MG/1
40 TABLET ORAL ONCE
Status: COMPLETED | OUTPATIENT
Start: 2024-06-26 | End: 2024-06-26

## 2024-06-26 RX ORDER — AMOXICILLIN 500 MG/1
1000 CAPSULE ORAL 3 TIMES DAILY
Qty: 42 CAPSULE | Refills: 0 | Status: SHIPPED | OUTPATIENT
Start: 2024-06-26 | End: 2024-06-26

## 2024-06-26 RX ORDER — AMOXICILLIN 500 MG/1
1000 CAPSULE ORAL 3 TIMES DAILY
Qty: 42 CAPSULE | Refills: 0 | Status: SHIPPED | OUTPATIENT
Start: 2024-06-26 | End: 2024-06-27

## 2024-06-26 RX ORDER — ACETAMINOPHEN 325 MG/1
650 TABLET ORAL ONCE
Status: COMPLETED | OUTPATIENT
Start: 2024-06-26 | End: 2024-06-26

## 2024-06-26 RX ORDER — AMOXICILLIN 250 MG/1
1000 CAPSULE ORAL ONCE
Status: COMPLETED | OUTPATIENT
Start: 2024-06-26 | End: 2024-06-26

## 2024-06-26 RX ADMIN — PREDNISONE 40 MG: 20 TABLET ORAL at 16:28

## 2024-06-26 RX ADMIN — AMOXICILLIN 1000 MG: 250 CAPSULE ORAL at 17:37

## 2024-06-26 RX ADMIN — ACETAMINOPHEN 650 MG: 325 TABLET, FILM COATED ORAL at 17:37

## 2024-06-26 NOTE — ED ATTENDING ATTESTATION
6/26/2024  I, Ibrahima Thompson DO, saw and evaluated the patient. I have discussed the patient with the resident/non-physician practitioner and agree with the resident's/non-physician practitioner's findings, Plan of Care, and MDM as documented in the resident's/non-physician practitioner's note, except where noted. All available labs and Radiology studies were reviewed.  I was present for key portions of any procedure(s) performed by the resident/non-physician practitioner and I was immediately available to provide assistance.       At this point I agree with the current assessment done in the Emergency Department.  I have conducted an independent evaluation of this patient a history and physical is as follows:    34-year-old female cough for 2 weeks just returned from [facility].  No fevers history of asthma since been using her albuterol inhaler more frequently.  Denies any hemoptysis fever chills does have some pleuritic chest pain.  Only when coughing no acute symptoms otherwise.  Looks well on exam normal vital signs clear lungs most feel other than left lower lung and expiratory wheezing.  Plan chest x-ray rule out pneumonia likely viral exacerbation of asthma as well    ED Course         Critical Care Time  Procedures

## 2024-06-26 NOTE — DISCHARGE INSTRUCTIONS
You were evaluated in the Emergency Department today for cough and congestion.     Can take motrin and/or tylenol for pain control. Take Amoxicillin three times a day for 7 days.     Please schedule an appointment with your primary care physician within the next 2-3 days.    Return to the Emergency Department if you experience worsening or uncontrolled pain, fevers 100.4°F or greater, recurrent vomiting, inability to tolerate food or fluids by mouth, bloody stools or vomit, black or tarry stools, or any other concerning symptoms.    Thank you for choosing us for your care.

## 2024-06-26 NOTE — ED PROVIDER NOTES
History  Chief Complaint   Patient presents with    Cold Like Symptoms     Persistent cough ongoing for multiple weeks. Pt states she was recently traveling outside of the country, and since she returned two weeks ago sx have gotten worse. Pt c/o cough, body aches, fever. Pt has been taking tylenol and motrin to manage symptoms at home     34 year old female with pmh of asthma presents to the ED for evaluation of pleurity chest pain, productive cough, body aches, subjective fever for the past 2 weeks. Pt notes she recently came back from the [facility] and developed the symptoms two days later. Pt took two days of tamiflu when the symptoms first started, symptoms initially improved but then worsened again. Pt have been taking tylenol and ibuprofen for the fever and body aches. Pt have had to use her albuterol inhaler every other day. Pt denies abdominal pain, n/v, dysuria           Prior to Admission Medications   Prescriptions Last Dose Informant Patient Reported? Taking?   benzocaine-menthol-lanolin-aloe (DERMOPLAST) 20-0.5 % topical spray   No No   Sig: Apply 1 application topically 4 (four) times a day as needed for mild pain   cetirizine (ZyrTEC) 10 mg tablet   No No   Sig: Take 1 tablet (10 mg total) by mouth daily   diphenhydrAMINE (BENADRYL) 25 mg tablet   No No   Sig: Take 2 tablets (50 mg total) by mouth daily at bedtime   hydrocortisone 2.5 % lotion   No No   Sig: Apply topically 2 (two) times a day      Facility-Administered Medications: None       Past Medical History:   Diagnosis Date    Asthma        History reviewed. No pertinent surgical history.    History reviewed. No pertinent family history.  I have reviewed and agree with the history as documented.    E-Cigarette/Vaping     E-Cigarette/Vaping Substances     Social History     Tobacco Use    Smoking status: Every Day     Current packs/day: 1.00     Types: Cigarettes    Smokeless tobacco: Never   Substance Use Topics    Alcohol use: Yes    Drug  use: Yes     Types: Marijuana        Review of Systems   Constitutional:  Positive for fever (subjective). Negative for appetite change, chills, diaphoresis and unexpected weight change.        Generalized bodyache   HENT:  Negative for dental problem, ear pain, facial swelling, sore throat and trouble swallowing.    Eyes:  Negative for pain and visual disturbance.   Respiratory:  Positive for cough. Negative for chest tightness and shortness of breath.    Cardiovascular:  Positive for chest pain (pleuritic). Negative for palpitations and leg swelling.   Gastrointestinal:  Negative for abdominal distention, abdominal pain, constipation, diarrhea, nausea and vomiting.   Endocrine: Negative for polyuria.   Genitourinary:  Negative for difficulty urinating, dysuria and hematuria.   Musculoskeletal:  Negative for arthralgias and back pain.   Skin:  Negative for color change and rash.   Neurological:  Negative for dizziness, seizures, syncope, light-headedness and headaches.   Psychiatric/Behavioral:  Negative for confusion.    All other systems reviewed and are negative.      Physical Exam  ED Triage Vitals [06/26/24 1415]   Temperature Pulse Respirations Blood Pressure SpO2   97.6 °F (36.4 °C) 86 18 112/93 97 %      Temp src Heart Rate Source Patient Position - Orthostatic VS BP Location FiO2 (%)   -- Monitor -- -- --      Pain Score       --             Orthostatic Vital Signs  Vitals:    06/26/24 1415   BP: 112/93   Pulse: 86       Physical Exam  Vitals and nursing note reviewed.   Constitutional:       General: She is not in acute distress.     Appearance: Normal appearance. She is well-developed. She is not ill-appearing, toxic-appearing or diaphoretic.   HENT:      Head: Normocephalic and atraumatic.      Right Ear: External ear normal.      Left Ear: External ear normal.      Nose: Nose normal.      Mouth/Throat:      Mouth: Mucous membranes are moist.   Eyes:      General: No scleral icterus.        Right eye:  No discharge.      Extraocular Movements: Extraocular movements intact.      Conjunctiva/sclera: Conjunctivae normal.   Cardiovascular:      Rate and Rhythm: Normal rate and regular rhythm.      Pulses: Normal pulses.      Heart sounds: No murmur heard.  Pulmonary:      Effort: Pulmonary effort is normal. No respiratory distress.      Breath sounds: Normal breath sounds. No wheezing, rhonchi or rales.   Chest:      Chest wall: Tenderness present.       Abdominal:      General: Abdomen is flat. There is no distension.      Palpations: Abdomen is soft.      Tenderness: There is no abdominal tenderness. There is no guarding or rebound.   Musculoskeletal:         General: No swelling, deformity or signs of injury. Normal range of motion.      Cervical back: Normal range of motion and neck supple. No rigidity or tenderness.      Right lower leg: No edema.      Left lower leg: No edema.   Skin:     General: Skin is warm and dry.      Capillary Refill: Capillary refill takes less than 2 seconds.   Neurological:      General: No focal deficit present.      Mental Status: She is alert and oriented to person, place, and time.   Psychiatric:         Mood and Affect: Mood normal.         ED Medications  Medications   amoxicillin (AMOXIL) capsule 1,000 mg (has no administration in time range)   predniSONE tablet 40 mg (40 mg Oral Given 6/26/24 1628)       Diagnostic Studies  Results Reviewed       Procedure Component Value Units Date/Time    POCT pregnancy, urine [[id number]]  (Normal) Resulted: 06/26/24 1622    Lab Status: Final result Updated: 06/26/24 1622     EXT Preg Test, Ur Negative     Control Valid                   XR chest 2 views    (Results Pending)         Procedures  Procedures      ED Course  ED Course as of 06/26/24 1705   Wed Jun 26, 2024   1627 PREGNANCY TEST URINE: Negative   1700 XR chest 2 views  Significant for LLE pna, maybe right middle lobe pna  Will treat with amoxicillin                               SBIRT 20yo+      Flowsheet Row Most Recent Value   Initial Alcohol Screen: US AUDIT-C     1. How often do you have a drink containing alcohol? 0 Filed at: 06/26/2024 1415   2. How many drinks containing alcohol do you have on a typical day you are drinking?  0 Filed at: 06/26/2024 1415   3a. Male UNDER 65: How often do you have five or more drinks on one occasion? 0 Filed at: 06/26/2024 1415   3b. FEMALE Any Age, or MALE 65+: How often do you have 4 or more drinks on one occassion? 0 Filed at: 06/26/2024 1415   Audit-C Score 0 Filed at: 06/26/2024 1415   CISCO: How many times in the past year have you...    Used an illegal drug or used a prescription medication for non-medical reasons? Never Filed at: 06/26/2024 1415                  Medical Decision Making  34 year old female with pmh of asthma presents to the ED for evaluation of pleurity chest pain, productive cough, body aches, subjective fever for the past 2 weeks. Pt currently taking OCP.    DDX: PNA vs viral illness, asthma exacerbation  Will evaluate with CXR  Will treat with 1 dose of prednisone and re-evaluate  Upon re-evaluation pt reports feeling better  CXR - significant for LLL PNA - will give first dose of amoxicillin 1g in the ED  Imaging and urine pregnancy result dicussed with pt who expressed understanding and agrees for d/c home with prescription for amoxicillin 1g TID and f/u with pcp.     Amount and/or Complexity of Data Reviewed  External Data Reviewed: radiology.  Labs: ordered. Decision-making details documented in ED Course.  Radiology: ordered and independent interpretation performed. Decision-making details documented in ED Course.    Risk  Prescription drug management.          Disposition  Final diagnoses:   Cough in adult   Community acquired bacterial pneumonia     Time reflects when diagnosis was documented in both MDM as applicable and the Disposition within this note       Time User Action Codes  Description Comment    6/26/2024  4:29 PM Justen Lloyd [R05.9] Cough in adult     6/26/2024  5:02 PM Justen Lloyd [J15.9] Community acquired bacterial pneumonia           ED Disposition       ED Disposition   Discharge    Condition   Stable    Date/Time   Wed Jun 26, 2024 1701    Comment   Ronan Rosalesd discharge to home/self care.                   Follow-up Information       Follow up With Specialties Details Why Contact Info Additional Information    [facility] Medicine Schedule an appointment as soon as possible for a visit   [address] [facility] [id number] [phone] [facility], [address], [place], [address] [phone]            Patient's Medications   Discharge Prescriptions    AMOXICILLIN (AMOXIL) 500 MG CAPSULE    Take 2 capsules (1,000 mg total) by mouth 3 (three) times a day for 7 days       Start Date: 6/26/2024 End Date: 7/3/2024       Order Dose: 1,000 mg       Quantity: 42 capsule    Refills: 0         PDMP Review       None             ED Provider  Attending physically available and evaluated Ronan Westbrook. I managed the patient along with the ED Attending.    Electronically Signed by           Justen Lloyd DO  06/26/24 9548

## 2024-06-26 NOTE — Clinical Note
Ronan Westbrook was seen and treated in our emergency department on 6/26/2024.                Diagnosis:     Ronan  may return to work on return date.    She may return on this date: 06/30/2024         If you have any questions or concerns, please don't hesitate to call.      Justen Lloyd DO    ______________________________           _______________          _______________  Hospital Representative                              Date                                Time

## 2024-06-27 RX ORDER — AMOXICILLIN 500 MG/1
1000 CAPSULE ORAL 3 TIMES DAILY
Qty: 42 CAPSULE | Refills: 0 | Status: SHIPPED | OUTPATIENT
Start: 2024-06-27 | End: 2024-06-27

## 2024-06-27 RX ORDER — AMOXICILLIN 500 MG/1
1000 CAPSULE ORAL 3 TIMES DAILY
Qty: 42 CAPSULE | Refills: 0 | Status: SHIPPED | OUTPATIENT
Start: 2024-06-27 | End: 2024-07-04

## 2024-11-10 ENCOUNTER — HOSPITAL ENCOUNTER (EMERGENCY)
Facility: HOSPITAL | Age: 34
Discharge: HOME | End: 2024-11-10
Attending: EMERGENCY MEDICINE

## 2024-11-10 VITALS
DIASTOLIC BLOOD PRESSURE: 63 MMHG | HEART RATE: 68 BPM | RESPIRATION RATE: 15 BRPM | SYSTOLIC BLOOD PRESSURE: 118 MMHG | OXYGEN SATURATION: 99 % | TEMPERATURE: 97.4 F

## 2024-11-10 DIAGNOSIS — H69.93 DYSFUNCTION OF BOTH EUSTACHIAN TUBES: ICD-10-CM

## 2024-11-10 DIAGNOSIS — H72.92 PERFORATED TYMPANIC MEMBRANE, LEFT: Primary | ICD-10-CM

## 2024-11-10 PROCEDURE — 99284 EMERGENCY DEPT VISIT MOD MDM: CPT | Performed by: EMERGENCY MEDICINE

## 2024-11-10 PROCEDURE — 99282 EMERGENCY DEPT VISIT SF MDM: CPT

## 2024-11-10 RX ORDER — FLUTICASONE PROPIONATE 50 MCG
1 SPRAY, SUSPENSION (ML) NASAL DAILY
Qty: 16 G | Refills: 0 | Status: SHIPPED | OUTPATIENT
Start: 2024-11-10

## 2024-11-10 RX ORDER — AZELASTINE 1 MG/ML
1 SPRAY, METERED NASAL 2 TIMES DAILY
Qty: 1 ML | Refills: 0 | Status: SHIPPED | OUTPATIENT
Start: 2024-11-10

## 2024-11-10 RX ORDER — PSEUDOEPHEDRINE HCL 120 MG/1
120 TABLET, FILM COATED, EXTENDED RELEASE ORAL 2 TIMES DAILY
Qty: 14 TABLET | Refills: 0 | Status: SHIPPED | OUTPATIENT
Start: 2024-11-10 | End: 2024-11-17

## 2024-11-10 NOTE — ED PROVIDER NOTES
Time reflects when diagnosis was documented in both MDM as applicable and the Disposition within this note       Time User Action Codes Description Comment    11/10/2024  3:43 PM Shalom Moreno Add [H72.92] Perforated tympanic membrane, left     11/10/2024  3:45 PM Shalom Moreno Add [H69.93] Dysfunction of both eustachian tubes           ED Disposition       ED Disposition   Discharge    Condition   Stable    Date/Time   Sun Nov 10, 2024  3:45 PM    Comment   Ronan Westbrook discharge to home/self care.                   Assessment & Plan       Medical Decision Making  34-year-old female with chronic seasonal allergies and recurrent ear infections with bleeding and ear pain.  Patient with normal vital signs on presentation.  Patient is overall appearing and not in acute distress.  HEENT exam significant for attic retraction of bilateral tympanic membranes with membrane perforation on the left as well as an effusion.  Exam consistent with bilateral eustachian tube dysfunction, left tympanic membrane perforation, left otitis media.  I will prescribe Flonase, azelastine, Sudafed, Augmentin, and give ENT referral.  Patient voiced understanding the plan and all questions answered.  Patient is safe for discharge at this time.    Risk  OTC drugs.  Prescription drug management.             Medications - No data to display    ED Risk Strat Scores                                               History of Present Illness       Chief Complaint   Patient presents with    Earache     This AM woke up with blood coming out of left ear. Now having dizziness and throbbing pain. Denies any further blood from ear. Pt took tylenol around 1345 today        Past Medical History:   Diagnosis Date    Asthma       History reviewed. No pertinent surgical history.   History reviewed. No pertinent family history.   Social History     Tobacco Use    Smoking status: Every Day     Current packs/day: 1.00     Types: Cigarettes    Smokeless  tobacco: Never   Substance Use Topics    Alcohol use: Yes    Drug use: Yes     Types: Marijuana      E-Cigarette/Vaping      E-Cigarette/Vaping Substances      I have reviewed and agree with the history as documented.     HPI  34-year-old female with history of seasonal allergies, recurrent ear infections presents to the ED for evaluation of bleeding and pain of the left ear.  She says that she woke up this morning with myalgias and malaise and then she started having bleeding out of her left ear which lasted for about 30 minutes.  Later in the day her ear started throbbing and she did take Tylenol which did help.  She states that her hearing sounds a little muffled.  Patient does endorse nasal congestion and rhinorrhea.  Denies fevers, cough, chest pain, shortness of breath, nausea, vomiting.  Review of Systems  See HPI      Objective       ED Triage Vitals [11/10/24 1457]   Temperature Pulse Blood Pressure Respirations SpO2 Patient Position - Orthostatic VS   (!) 97.4 °F (36.3 °C) 68 118/63 15 99 % --      Temp Source Heart Rate Source BP Location FiO2 (%) Pain Score    Temporal Monitor Left arm -- 6      Vitals      Date and Time Temp Pulse SpO2 Resp BP Pain Score FACES Pain Rating User   11/10/24 1457 97.4 °F (36.3 °C) 68 99 % 15 118/63 6 -- MO            Physical Exam  Constitutional:       Appearance: Normal appearance.   HENT:      Head: Normocephalic and atraumatic.      Ears:      Comments: See pictures below.  Right tympanic membrane with attic retraction noted.  Left tympanic membrane also shows attic retraction as well as tympanic membrane perforation with small amount of bleeding noted as well as effusion.     Mouth/Throat:      Mouth: Mucous membranes are moist.      Pharynx: Oropharynx is clear.   Eyes:      Extraocular Movements: Extraocular movements intact.      Conjunctiva/sclera: Conjunctivae normal.   Cardiovascular:      Rate and Rhythm: Normal rate and regular rhythm.      Pulses: Normal  pulses.   Pulmonary:      Effort: Pulmonary effort is normal.   Musculoskeletal:      Cervical back: Normal range of motion.   Skin:     General: Skin is warm and dry.   Neurological:      Mental Status: She is alert.       Right TM    Left TM      Results Reviewed       None            No orders to display       Procedures    ED Medication and Procedure Management   Prior to Admission Medications   Prescriptions Last Dose Informant Patient Reported? Taking?   benzocaine-menthol-lanolin-aloe (DERMOPLAST) 20-0.5 % topical spray   No No   Sig: Apply 1 application topically 4 (four) times a day as needed for mild pain   cetirizine (ZyrTEC) 10 mg tablet   No No   Sig: Take 1 tablet (10 mg total) by mouth daily   diphenhydrAMINE (BENADRYL) 25 mg tablet   No No   Sig: Take 2 tablets (50 mg total) by mouth daily at bedtime   hydrocortisone 2.5 % lotion   No No   Sig: Apply topically 2 (two) times a day      Facility-Administered Medications: None     Discharge Medication List as of 11/10/2024  3:48 PM        START taking these medications    Details   azelastine (ASTELIN) 0.1 % nasal spray 1 spray into each nostril 2 (two) times a day Use in each nostril as directed, Starting Sun 11/10/2024, Normal      fluticasone (FLONASE) 50 mcg/act nasal spray 1 spray into each nostril daily, Starting Sun 11/10/2024, Normal      pseudoephedrine (SUDAFED) 120 MG 12 hr tablet Take 1 tablet (120 mg total) by mouth 2 (two) times a day for 7 days, Starting Sun 11/10/2024, Until Sun 11/17/2024, Normal           CONTINUE these medications which have NOT CHANGED    Details   benzocaine-menthol-lanolin-aloe (DERMOPLAST) 20-0.5 % topical spray Apply 1 application topically 4 (four) times a day as needed for mild pain, Starting Wed 1/4/2023, Normal      cetirizine (ZyrTEC) 10 mg tablet Take 1 tablet (10 mg total) by mouth daily, Starting Fri 12/23/2022, Normal      diphenhydrAMINE (BENADRYL) 25 mg tablet Take 2 tablets (50 mg total) by mouth  daily at bedtime, Starting Fri 12/23/2022, Normal      hydrocortisone 2.5 % lotion Apply topically 2 (two) times a day, Starting Fri 12/23/2022, Normal             ED SEPSIS DOCUMENTATION   Time reflects when diagnosis was documented in both MDM as applicable and the Disposition within this note       Time User Action Codes Description Comment    11/10/2024  3:43 PM Shalom Moreno [H72.92] Perforated tympanic membrane, left     11/10/2024  3:45 PM Shalom Moreno [H69.93] Dysfunction of both eustachian tubes                  Shalom Moreno, DO  11/10/24 1840

## 2024-11-10 NOTE — Clinical Note
Ronan Westbrook was seen and treated in our emergency department on 11/10/2024.                Diagnosis:     Ronan  may return to work on return date.    She may return on this date: 11/12/2024         If you have any questions or concerns, please don't hesitate to call.      Shalom Moreno, DO    ______________________________           _______________          _______________  Hospital Representative                              Date                                Time

## 2024-11-10 NOTE — ED ATTENDING ATTESTATION
11/10/2024  I, Yoshi Mathew DO, saw and evaluated the patient. I have discussed the patient with the resident/non-physician practitioner and agree with the resident's/non-physician practitioner's findings, Plan of Care, and MDM as documented in the resident's/non-physician practitioner's note, except where noted. All available labs and Radiology studies were reviewed.  I was present for key portions of any procedure(s) performed by the resident/non-physician practitioner and I was immediately available to provide assistance.       At this point I agree with the current assessment done in the Emergency Department.  I have conducted an independent evaluation of this patient a history and physical is as follows:        35 yo woman c/o bilateral ear pressure over past few days, woke up today with blood coming out of her L ear. Has hx of allergic rhinitis, ear issues including infections. States she has seen ENT but was told nothing to do really.    I reviewed ENT note in care everywhere from 2/28/24 - diagnosis AOM, chronic eustachian tube dysfunction - recommended baby oil both ears once weekly, avoid q-tips, oxymetazoline and pseudoephedrine 2 days prior to flight, smoking cessation, eval for hearing aids, weight loss, follow up in 6 mo for hearing test and follow up for allergy testing and chronic sinus issues.    R EAC clear, no edema or discharge. TM retracted with small amount of tympanosclerosis. No JAEL present. No pain with manipulation of the external ear. No mastoid TTP:        L EAC with some pooled venous blood. Otherwise no edema or discharge. TM retracted with small to moderate amount of tympanosclerosis. No JAEL present. Mild pain with manipulation of the external ear. No mastoid TTP:      Imp: spontaneous L TM perforation due to chronic eustachian tube dysfunction. plan: decongestants, follow up ENT. Consider abx if no improvement in the next few days. Will send rx. Patient to fill if no  improvement.      ED Course         Critical Care Time  Procedures

## 2024-11-10 NOTE — DISCHARGE INSTRUCTIONS
Your eardrum is ruptured.  You should not submerge your head until it heals.  I will be giving you 2 nose sprays which you should use daily.  I will also be giving you an oral decongestion to take twice a day for 1 week.  You can also use Motrin for symptomatic relief of the pain.  I will also be giving you a referral to the ear nose and throat doctor.  You should schedule an appointment with them as soon as possible.  You should return to the emergency room if you have severe pain that is uncontrolled by home medication, if you have persistent bleeding from the ear that you cannot stop, or if you cannot hear at all out of the left ear.

## 2025-04-15 ENCOUNTER — HOSPITAL ENCOUNTER (EMERGENCY)
Facility: HOSPITAL | Age: 35
Discharge: HOME | End: 2025-04-15
Attending: EMERGENCY MEDICINE

## 2025-04-15 VITALS
HEIGHT: 66 IN | WEIGHT: 190 LBS | RESPIRATION RATE: 20 BRPM | OXYGEN SATURATION: 98 % | TEMPERATURE: 98.5 F | SYSTOLIC BLOOD PRESSURE: 160 MMHG | HEART RATE: 82 BPM | DIASTOLIC BLOOD PRESSURE: 93 MMHG | BODY MASS INDEX: 30.53 KG/M2

## 2025-04-15 DIAGNOSIS — G43.909 MIGRAINE: Primary | ICD-10-CM

## 2025-04-15 PROCEDURE — 96361 HYDRATE IV INFUSION ADD-ON: CPT

## 2025-04-15 PROCEDURE — 96375 TX/PRO/DX INJ NEW DRUG ADDON: CPT

## 2025-04-15 PROCEDURE — 99284 EMERGENCY DEPT VISIT MOD MDM: CPT | Performed by: EMERGENCY MEDICINE

## 2025-04-15 PROCEDURE — 96374 THER/PROPH/DIAG INJ IV PUSH: CPT

## 2025-04-15 PROCEDURE — 99282 EMERGENCY DEPT VISIT SF MDM: CPT

## 2025-04-15 RX ORDER — METOCLOPRAMIDE HYDROCHLORIDE 5 MG/ML
10 INJECTION INTRAMUSCULAR; INTRAVENOUS ONCE
Status: COMPLETED | OUTPATIENT
Start: 2025-04-15 | End: 2025-04-15

## 2025-04-15 RX ORDER — DIPHENHYDRAMINE HYDROCHLORIDE 50 MG/ML
25 INJECTION, SOLUTION INTRAMUSCULAR; INTRAVENOUS ONCE
Status: COMPLETED | OUTPATIENT
Start: 2025-04-15 | End: 2025-04-15

## 2025-04-15 RX ORDER — ACETAMINOPHEN 325 MG/1
975 TABLET ORAL ONCE
Status: COMPLETED | OUTPATIENT
Start: 2025-04-15 | End: 2025-04-15

## 2025-04-15 RX ORDER — KETOROLAC TROMETHAMINE 30 MG/ML
15 INJECTION, SOLUTION INTRAMUSCULAR; INTRAVENOUS ONCE
Status: COMPLETED | OUTPATIENT
Start: 2025-04-15 | End: 2025-04-15

## 2025-04-15 RX ADMIN — KETOROLAC TROMETHAMINE 15 MG: 30 INJECTION, SOLUTION INTRAMUSCULAR; INTRAVENOUS at 19:02

## 2025-04-15 RX ADMIN — ACETAMINOPHEN 975 MG: 325 TABLET, FILM COATED ORAL at 19:03

## 2025-04-15 RX ADMIN — DIPHENHYDRAMINE HYDROCHLORIDE 25 MG: 50 INJECTION, SOLUTION INTRAMUSCULAR; INTRAVENOUS at 19:02

## 2025-04-15 RX ADMIN — METOCLOPRAMIDE 10 MG: 5 INJECTION, SOLUTION INTRAMUSCULAR; INTRAVENOUS at 19:03

## 2025-04-15 RX ADMIN — SODIUM CHLORIDE 1000 ML: 0.9 INJECTION, SOLUTION INTRAVENOUS at 19:05

## 2025-04-15 NOTE — ED PROVIDER NOTES
Time reflects when diagnosis was documented in both MDM as applicable and the Disposition within this note       Time User Action Codes Description Comment    4/15/2025  8:31 PM Donna Morel Add [G43.909] Migraine           ED Disposition       ED Disposition   Discharge    Condition   Stable    Date/Time   Tue Apr 15, 2025  8:31 PM    Comment   Maysa Westbrook discharge to home/self care.                   Assessment & Plan       Medical Decision Making  Risk  OTC drugs.  Prescription drug management.          Patient is a 35-year-old female with history of migraines who presents for evaluation of headache.  On exam, patient is in no acute distress.  Vital signs are stable.  No focal neuro findings.  Differential includes migraine versus tension headache.  Will treat with a migraine cocktail and reevaluate.    On reevaluation, patient's symptoms have resolved.  She states she feels much better and is ready to go home.  She was told to follow-up with PCP.  She was given return precautions and discharged in stable condition.               Medications   sodium chloride 0.9 % bolus 1,000 mL (0 mL Intravenous Stopped 4/15/25 2032)   acetaminophen (TYLENOL) tablet 975 mg (975 mg Oral Given 4/15/25 1903)   ketorolac (TORADOL) injection 15 mg (15 mg Intravenous Given 4/15/25 1902)   metoclopramide (REGLAN) injection 10 mg (10 mg Intravenous Given 4/15/25 1903)   diphenhydrAMINE (BENADRYL) injection 25 mg (25 mg Intravenous Given 4/15/25 1902)       ED Risk Strat Scores                    No data recorded        SBIRT 22yo+      Flowsheet Row Most Recent Value   Initial Alcohol Screen: US AUDIT-C     1. How often do you have a drink containing alcohol? 0 Filed at: 04/15/2025 1833   2. How many drinks containing alcohol do you have on a typical day you are drinking?  0 Filed at: 04/15/2025 1833   3b. FEMALE Any Age, or MALE 65+: How often do you have 4 or more drinks on one occassion? 0 Filed at: 04/15/2025 1833   Audit-C Score 0  Filed at: 04/15/2025 1833   CISCO: How many times in the past year have you...    Used an illegal drug or used a prescription medication for non-medical reasons? Never Filed at: 04/15/2025 1833                            History of Present Illness       Chief Complaint   Patient presents with    Migraine     Per pt has had a migraine since this past weekend, took tylenol and motrin with no relief.       Past Medical History:   Diagnosis Date    Asthma       History reviewed. No pertinent surgical history.   History reviewed. No pertinent family history.   Social History     Tobacco Use    Smoking status: Every Day     Current packs/day: 1.00     Types: Cigarettes    Smokeless tobacco: Never   Substance Use Topics    Alcohol use: Yes    Drug use: Yes     Types: Marijuana      E-Cigarette/Vaping      E-Cigarette/Vaping Substances      I have reviewed and agree with the history as documented.     HPI    Patient is a 35-year-old female with history of migraines who presents for evaluation of headache.  Patient states over the weekend she developed a left-sided headache.  She has been taking Tylenol and Motrin with some relief of the pain but today pain would not improve.  Pain was gradual in onset.  No trauma.  No AP/AC medication.  She has photophobia but no vision changes. She is nauseous but has not vomited. She has had headaches in the past but pain is lasting longer. She did have dental work done 2 weeks ago on the left side but she felt well following it.  No fevers.    Review of Systems   Constitutional:  Negative for chills and fever.   HENT:  Negative for congestion and sore throat.    Respiratory:  Negative for cough and shortness of breath.    Cardiovascular:  Negative for chest pain and palpitations.   Gastrointestinal:  Negative for abdominal pain and vomiting.   Musculoskeletal:  Negative for neck pain and neck stiffness.   All other systems reviewed and are negative.          Objective       ED Triage  Vitals   Temperature Pulse Blood Pressure Respirations SpO2 Patient Position - Orthostatic VS   04/15/25 1830 04/15/25 1830 04/15/25 1833 04/15/25 1830 04/15/25 1830 04/15/25 1833   98.5 °F (36.9 °C) 82 160/93 20 98 % Sitting      Temp Source Heart Rate Source BP Location FiO2 (%) Pain Score    04/15/25 1830 04/15/25 1830 04/15/25 1833 -- 04/15/25 1830    Oral Monitor Right arm  10 - Worst Possible Pain      Vitals      Date and Time Temp Pulse SpO2 Resp BP Pain Score FACES Pain Rating User   04/15/25 1833 -- -- -- -- 160/93 -- -- AM   04/15/25 1832 -- -- 98 % -- -- -- -- AM   04/15/25 1830 98.5 °F (36.9 °C) 82 98 % 20 -- 10 - Worst Possible Pain -- AM            Physical Exam  Vitals and nursing note reviewed.   HENT:      Head: Normocephalic and atraumatic.      Nose: No congestion or rhinorrhea.      Mouth/Throat:      Mouth: Mucous membranes are moist.   Eyes:      General:         Right eye: No discharge.         Left eye: No discharge.      Extraocular Movements: Extraocular movements intact.      Pupils: Pupils are equal, round, and reactive to light.   Cardiovascular:      Rate and Rhythm: Normal rate and regular rhythm.   Pulmonary:      Effort: Pulmonary effort is normal. No respiratory distress.      Breath sounds: Normal breath sounds. No wheezing or rhonchi.   Abdominal:      Palpations: Abdomen is soft.      Tenderness: There is no abdominal tenderness.   Musculoskeletal:      Cervical back: Normal range of motion.      Right lower leg: No edema.      Left lower leg: No edema.   Skin:     General: Skin is warm and dry.      Capillary Refill: Capillary refill takes less than 2 seconds.   Neurological:      Mental Status: She is alert.      Sensory: No sensory deficit.      Motor: No weakness.      Coordination: Coordination normal.   Psychiatric:         Mood and Affect: Mood normal.         Results Reviewed       None            No orders to display       Procedures    ED Medication and Procedure  Management   Prior to Admission Medications   Prescriptions Last Dose Informant Patient Reported? Taking?   azelastine (ASTELIN) 0.1 % nasal spray   No No   Si spray into each nostril 2 (two) times a day Use in each nostril as directed   benzocaine-menthol-lanolin-aloe (DERMOPLAST) 20-0.5 % topical spray   No No   Sig: Apply 1 application topically 4 (four) times a day as needed for mild pain   cetirizine (ZyrTEC) 10 mg tablet   No No   Sig: Take 1 tablet (10 mg total) by mouth daily   diphenhydrAMINE (BENADRYL) 25 mg tablet   No No   Sig: Take 2 tablets (50 mg total) by mouth daily at bedtime   fluticasone (FLONASE) 50 mcg/act nasal spray   No No   Si spray into each nostril daily   hydrocortisone 2.5 % lotion   No No   Sig: Apply topically 2 (two) times a day   pseudoephedrine (SUDAFED) 120 MG 12 hr tablet   No No   Sig: Take 1 tablet (120 mg total) by mouth 2 (two) times a day for 7 days      Facility-Administered Medications: None     Discharge Medication List as of 4/15/2025  8:31 PM        CONTINUE these medications which have NOT CHANGED    Details   azelastine (ASTELIN) 0.1 % nasal spray 1 spray into each nostril 2 (two) times a day Use in each nostril as directed, Starting Sun 11/10/2024, Normal      benzocaine-menthol-lanolin-aloe (DERMOPLAST) 20-0.5 % topical spray Apply 1 application topically 4 (four) times a day as needed for mild pain, Starting Wed 2023, Normal      cetirizine (ZyrTEC) 10 mg tablet Take 1 tablet (10 mg total) by mouth daily, Starting 2022, Normal      diphenhydrAMINE (BENADRYL) 25 mg tablet Take 2 tablets (50 mg total) by mouth daily at bedtime, Starting 2022, Normal      fluticasone (FLONASE) 50 mcg/act nasal spray 1 spray into each nostril daily, Starting Sun 11/10/2024, Normal      hydrocortisone 2.5 % lotion Apply topically 2 (two) times a day, Starting 2022, Normal      pseudoephedrine (SUDAFED) 120 MG 12 hr tablet Take 1 tablet (120 mg  total) by mouth 2 (two) times a day for 7 days, Starting Sun 11/10/2024, Until Sun 11/17/2024, Normal           No discharge procedures on file.  ED SEPSIS DOCUMENTATION   Time reflects when diagnosis was documented in both MDM as applicable and the Disposition within this note       Time User Action Codes Description Comment    4/15/2025  8:31 PM Donna Morel Add [G43.909] Migraine                  Donna Morel MD  04/19/25 0714

## 2025-04-15 NOTE — ED ATTENDING ATTESTATION
4/15/2025  I, Sanju Cooney MD, saw and evaluated the patient. I have discussed the patient with the resident/non-physician practitioner and agree with the resident's/non-physician practitioner's findings, Plan of Care, and MDM as documented in the resident's/non-physician practitioner's note, except where noted. All available labs and Radiology studies were reviewed.  I was present for key portions of any procedure(s) performed by the resident/non-physician practitioner and I was immediately available to provide assistance.       At this point I agree with the current assessment done in the Emergency Department.  I have conducted an independent evaluation of this patient a history and physical is as follows:  H/o migraines   gradual onset   several days  photophobia  no vomiting     No fever no neck pain   No focal neuro symptoms   Left sided throbbing HA   Exam the patient is in no acute distress HEENT exam is unremarkable pupils equal round reactive to light extraocular muscles intact no facial swelling disc margins sharp  Neck is supple lungs clear  Heart regular abdomen soft nontender neurologic exam cranial nerves II through XII intact motor 5 out of 5 sensory grossly intact cerebellar testing normal  Gait normal  Impression migraine cephalgia we will treat symptomatically  ED Course         Critical Care Time  Procedures

## 2025-04-16 NOTE — DISCHARGE INSTRUCTIONS
You were seen in the Emergency Department today for a migraine.    Please follow up with your primary care doctor.  Please return to the Emergency Department if you experience worsening of your current symptoms, changes in vision, recurrent vomiting, weakness, or any other concerning symptoms.